# Patient Record
Sex: FEMALE
[De-identification: names, ages, dates, MRNs, and addresses within clinical notes are randomized per-mention and may not be internally consistent; named-entity substitution may affect disease eponyms.]

---

## 2023-02-14 ENCOUNTER — NURSE TRIAGE (OUTPATIENT)
Dept: OTHER | Facility: CLINIC | Age: 73
End: 2023-02-14

## 2023-02-24 ENCOUNTER — NURSE TRIAGE (OUTPATIENT)
Dept: OTHER | Facility: CLINIC | Age: 73
End: 2023-02-24

## 2023-02-24 NOTE — TELEPHONE ENCOUNTER
Location of patient: [facility] MRN: [id number]    Provider: Tammy Menard    Subjective: Caller states \"Was given antibiotics for sinus infection on Monday. States she feels worse today. Denies SOB, Chest pain, feeling faint    Current Symptoms: sinus pain, headache, fatigue        Pain Severity: 4/10; aching, sore, pressure; Temperature: denies     What has been tried: steam,    Recommended disposition: Be seen in 24 hours    Care advice provided, patient verbalizes understanding; denies any other questions or concerns.     Outcome: Scheduled for visit 02/25/2023    If no appointments available, patient referred to Ur[facility]      This triage is a result of a call to the [place] [id number]    Reason for Disposition   [1] Taking antibiotic > 72 hours (3 days) AND [2] sinus pain not improved    Protocols used: Sinus Infection on Antibiotic Follow-up Call-ADULT-

## 2024-06-11 NOTE — TELEPHONE ENCOUNTER
Location of patient: [place]    Received call from Natalya at [facility] with Red Flag Complaint.    Caroline MRN: [id number]    Provider: Dr. Marilou Davies    Subjective: Caller states \"I was exposed to COVID on 2/9, tested negative 2/14, home test.\"     Current Symptoms: Headache, 6/10; body aches 3/10; sinus congestion, mild sore throat, mild dry cough, chest tightness, fatigue; minimal chest tightness (baseline when she gets sick)    PMH: burning mouth syndrome, IBS, abnormal LFT's    Denies: CP, SOB, difficulty breathing, arm/jaw pain, N/V, dizziness, weakness, or vision issues.    Associated Symptoms: reduced activity, reduced appetite, increased wakefulness    Pain Severity: see current symptoms above    Temperature: feels like low grade fever, hot flashes    What has been tried: Flonase    Recommended disposition: Call PCP when Office is Open    Care advice provided, patient verbalizes understanding; denies any other questions or concerns.    Outcome: Patient has an appt scheduled for 2/17    No Appointments available patient referred to  [facility] or Walk In but patient prefers to be seen at regular PCP office.    This triage is a result of a call to the [facility] Nurse Line    Reason for Disposition   [1] COVID-19 infection suspected by caller or triager AND [2] mild symptoms (cough, fever, or others) AND [3] negative COVID-19 rapid test    Protocols used: Coronavirus (COVID-19) Diagnosed or Suspected-ADULT-     Unable to assess due to medical condition